# Patient Record
Sex: FEMALE | Race: WHITE | URBAN - METROPOLITAN AREA
[De-identification: names, ages, dates, MRNs, and addresses within clinical notes are randomized per-mention and may not be internally consistent; named-entity substitution may affect disease eponyms.]

---

## 2019-04-19 ENCOUNTER — OFFICE VISIT (OUTPATIENT)
Dept: FAMILY MEDICINE CLINIC | Age: 34
End: 2019-04-19

## 2019-04-19 ENCOUNTER — DOCUMENTATION ONLY (OUTPATIENT)
Dept: FAMILY MEDICINE CLINIC | Age: 34
End: 2019-04-19

## 2019-04-19 NOTE — PROGRESS NOTES
Patient came in expecting to see a Sports Medicine provider and she was scheduled as a New Patient instead. Patient stated that she specifically told the  that that is why she was coming in today. Informed her that this provider was not a Sports Medicine provider and she said well I need to be seen today and if I can't be seen then I need to go. I apologized to the patient and she said will I get my co pay back and I informed her that it was voided out. She grabbed her things and walked out.